# Patient Record
Sex: MALE | Race: WHITE | NOT HISPANIC OR LATINO | Employment: FULL TIME | URBAN - METROPOLITAN AREA
[De-identification: names, ages, dates, MRNs, and addresses within clinical notes are randomized per-mention and may not be internally consistent; named-entity substitution may affect disease eponyms.]

---

## 2024-08-26 ENCOUNTER — OFFICE VISIT (OUTPATIENT)
Dept: FAMILY MEDICINE | Facility: CLINIC | Age: 39
End: 2024-08-26
Payer: COMMERCIAL

## 2024-08-26 VITALS
DIASTOLIC BLOOD PRESSURE: 90 MMHG | OXYGEN SATURATION: 99 % | SYSTOLIC BLOOD PRESSURE: 143 MMHG | TEMPERATURE: 98.4 F | RESPIRATION RATE: 16 BRPM | HEART RATE: 66 BPM

## 2024-08-26 DIAGNOSIS — H53.9 TRANSIENT VISION DISTURBANCE: Primary | ICD-10-CM

## 2024-08-26 PROCEDURE — 99203 OFFICE O/P NEW LOW 30 MIN: CPT | Performed by: PHYSICIAN ASSISTANT

## 2024-08-27 NOTE — PROGRESS NOTES
Assessment & Plan     Transient vision disturbance  Discussed with pt unclear etiology, however I am reassured that has resolved.    I have recommended he be seen in the ED if it returns, to  go to the ED for severe HA.    Referral to opthomology for formal eye evaluation.        Nicole Alvarez PA-C  [facility]    Paul Silveira is a 38 year old male who presents to clinic today for the following health issues:  Chief Complaint   Patient presents with    Eye Problem     Was just finishing work and started with blurred vision  no peripheral vision  lasted for abt 20 minutes states pupils were dilated better now but states feels off       HPI  Pt presents to urgent care with concerns re: vision change.  He was at home today working without difficulty.    Was watching a video and flashing lights on the screen seemed off and more intense. Stopped watching the video.  Then noticed a change of vision  L peripheral vision decreased, felt like he had tunnel vision but not certain if it was on the R or not bluring peripherally boarders.  Seemed like soft light on the peripherally.  Lasted 15-20 min.    Couldn't see pupils when looked in the mirror which alarmed pt.    Rested and drank fluids   No room spinning or lightheadedness.   Felt wave of rush or panic after he noted is vision changes. .    No lightheaded.    General malaise now.   No T/N/W of arms or legs, face  No HA.        There is no problem list on file for this patient.    No current outpatient medications on file.     No current facility-administered medications for this visit.         Review of Systems  Constitutional, HEENT, cardiovascular, pulmonary, gi and gu systems are negative, except as otherwise noted.      Objective    BP (!) 143/90   Pulse 66   Temp 98.4  F (36.9  C) (Oral)   Resp 16   SpO2 99%   Physical Exam   Visual Acuity Screening: Snellen Eye Chart Results:   Right Eye: 20/20  Left Eye:  20/20  Both Eyes:20/20   Nad appears well  Lungs CTA  Heart RRR  PERRL  EOMI  Conjunctiva clear.    Peripheral vision intact to confrontation.    Lids unremarkable.

## 2024-08-27 NOTE — PATIENT INSTRUCTIONS
Go to the ER for recurrance of sx or if any severe headache, T/N/W of arms or legs.    Follow up with opthomology as ordered.

## 2024-11-16 ENCOUNTER — HEALTH MAINTENANCE LETTER (OUTPATIENT)
Age: 39
End: 2024-11-16